# Patient Record
Sex: MALE | Race: WHITE | NOT HISPANIC OR LATINO | Employment: STUDENT | URBAN - METROPOLITAN AREA
[De-identification: names, ages, dates, MRNs, and addresses within clinical notes are randomized per-mention and may not be internally consistent; named-entity substitution may affect disease eponyms.]

---

## 2024-02-19 ENCOUNTER — APPOINTMENT (OUTPATIENT)
Dept: PRIMARY CARE | Facility: CLINIC | Age: 24
End: 2024-02-19
Payer: COMMERCIAL

## 2024-04-16 ENCOUNTER — OFFICE VISIT (OUTPATIENT)
Dept: OTOLARYNGOLOGY | Facility: CLINIC | Age: 24
End: 2024-04-16
Payer: COMMERCIAL

## 2024-04-16 ENCOUNTER — CLINICAL SUPPORT (OUTPATIENT)
Dept: AUDIOLOGY | Facility: CLINIC | Age: 24
End: 2024-04-16
Payer: COMMERCIAL

## 2024-04-16 VITALS — WEIGHT: 182 LBS | HEIGHT: 69 IN | TEMPERATURE: 96.9 F | BODY MASS INDEX: 26.96 KG/M2

## 2024-04-16 DIAGNOSIS — H93.13 TINNITUS OF BOTH EARS: ICD-10-CM

## 2024-04-16 DIAGNOSIS — H93.293 ABNORMAL AUDITORY PERCEPTION OF BOTH EARS: Primary | ICD-10-CM

## 2024-04-16 DIAGNOSIS — T16.1XXA FOREIGN BODY IN RIGHT AUDITORY CANAL, INITIAL ENCOUNTER: ICD-10-CM

## 2024-04-16 PROCEDURE — 99203 OFFICE O/P NEW LOW 30 MIN: CPT | Performed by: OTOLARYNGOLOGY

## 2024-04-16 PROCEDURE — 92550 TYMPANOMETRY & REFLEX THRESH: CPT | Performed by: AUDIOLOGIST

## 2024-04-16 PROCEDURE — 1036F TOBACCO NON-USER: CPT | Performed by: OTOLARYNGOLOGY

## 2024-04-16 PROCEDURE — 92557 COMPREHENSIVE HEARING TEST: CPT | Performed by: AUDIOLOGIST

## 2024-04-16 RX ORDER — ESCITALOPRAM OXALATE 20 MG/1
20 TABLET ORAL
COMMUNITY
Start: 2023-02-14

## 2024-04-16 NOTE — PROGRESS NOTES
HPI  Fortunato Denton is a 23 y.o. male treated for bilateral ear infection and around December with symptoms of otalgia and hearing loss at that time.  Most of that has resolved and he only has some lingering symptoms of clicking in the ears bilaterally, some left some right.  Denies otorrhea.  Did have ear problems as a child.  Audiogram with normal thresholds, type a tympanograms bilaterally.      Past Medical History:   Diagnosis Date    Acute pharyngitis, unspecified 03/04/2015    Sore throat    Acute pharyngitis, unspecified 01/23/2014    Sore throat    Asthma ([facility]     Bitten or stung by nonvenomous insect and other nonvenomous arthropods, initial encounter 09/18/2019    Tick bite    Local infection of the skin and subcutaneous tissue, unspecified 09/18/2019    Infection, skin    Other conditions influencing health status 09/03/2013    Foot pain, unspecified laterality    Pain in right hand 10/05/2016    Pain of right hand    Personal history of other diseases of the nervous system and sense organs 01/23/2014    History of conjunctivitis    Personal history of other diseases of the respiratory system     Personal history of asthma    Personal history of other diseases of the respiratory system 09/29/2015    History of acute sinusitis    Personal history of other diseases of the respiratory system 09/29/2015    History of pharyngitis    Personal history of other diseases of the respiratory system 03/04/2015    History of upper respiratory infection    Personal history of other diseases of the respiratory system 03/21/2017    History of sore throat    Personal history of other diseases of the respiratory system 09/13/2018    History of sore throat    Personal history of other diseases of the respiratory system 03/06/2014    Personal history of acute sinusitis    Personal history of other diseases of the respiratory system 09/13/2013    History of pharyngitis    Personal history of other infectious and  "parasitic diseases 01/23/2014    History of viral infection    Personal history of other infectious and parasitic diseases 01/22/2015    History of viral infection    Personal history of other infectious and parasitic diseases 09/13/2018    History of wound infection    Personal history of other mental and behavioral disorders     History of attention deficit hyperactivity disorder    Personal history of other specified conditions 03/06/2014    History of diarrhea    Unspecified injury of unspecified wrist, hand and finger(s), initial encounter 12/05/2016    Hand injury            Medications:     Current Outpatient Medications:     escitalopram (Lexapro) 20 mg tablet, Take 1 tablet (20 mg) by mouth once daily., Disp: , Rfl:      Allergies:  No Known Allergies     Physical Exam:  Last Recorded Vitals  Temperature 36.1 °C (96.9 °F), height 1.746 m (5' 8.75\"), weight 82.6 kg (182 lb).  General:     General appearance: Well-developed, well-nourished in no acute distress.       Voice:  normal       Head/face: Normal appearance; nontender to palpation     Facial nerve function: Normal and symmetric bilaterally.    Oral/oropharynx:     Oral vestibule: Normal labial and gingival mucosa     Tongue/floor of mouth: Normal without lesion     Oropharynx: Clear.  No lesions present of the hard/soft palate, posterior pharynx    Neck:     Neck: Normal appearance, trachea midline     Salivary glands: Normal to palpation bilaterally     Lymph nodes: No cervical lymphadenopathy to palpation     Thyroid: No thyromegaly.  No palpable nodules     Range of motion: Normal    Neurological:     Cortical functions: Alert and oriented x3, appropriate affect       Larynx/hypopharynx:     Laryngeal findings: Mirror exam inadequate or limited secondary to enlarged base of tongue and/or excessive gagging    Ear:     Ear canal: Normal bilaterally.  Right ear canal with cat hair medially, removed with alligator and microscope     Tympanic membrane: " Intact and mobile bilaterally     Pinna: Normal bilaterally     Hearing:  Gross hearing assessment normal by voice    Nose:     Visualized using: Anterior rhinoscopy     Nasopharynx: Inadequate mirror exam secondary to gag, anatomy.       Nasal dorsum: Nontraumatic midline appearance     Septum: Midline     Inferior turbinates: Normally sized     Mucosa: Bilateral, pink, normal appearing       ASSESSMENT/PLAN:  Right hair removed.  This may help that side. He may try Flonase but given his normal exam and audiogram, I expect that this is just some lingering hold over from eustachian tube dysfunction with the infections and will resolve with time.  He was reassured.  Recheck as needed        Frederic Mancera MD

## 2024-04-16 NOTE — PROGRESS NOTES
"  AUDIOLOGY ADULT AUDIOMETRIC EVALUATION    Name:  Fortunato Denton  :  2000  Age:  23 y.o.  Date of Evaluation:  2024    Reason for visit: Mr. Denton is seen in the clinic today at the request of otolaryngology for an audiologic evaluation.     HISTORY  Patient complains of CRACKLE when swallowing and a \"problematic\" era infection in 2023.  He denies dizzy , fullness and other types of tinnitus outside of the crackle when swallowing.    EVALUATION  See scanned audiogram: “Media” > “Audiology Report”.      RESULTS  Otoscopic Evaluation:  Right Ear: clear ear canal  Left Ear: clear ear canal    Immittance Measures:  Tympanometry:  Right Ear: Type A, normal tympanic membrane mobility with normal middle ear pressure   Left Ear: Type A, normal tympanic membrane mobility with normal middle ear pressure     Acoustic Reflexes:  Ipsilateral Right Ear:  90 95 95  Ipsilateral Left Ear:    90 85 85  Contralateral Right Ear: did not evaluate  Contralateral Left Ear: did not evaluate    Distortion Product Otoacoustic Emissions (DPOAEs):  Right Ear: DNT  Left Ear:   DNT    Audiometry:  Test Technique and Reliability:  BEHAVIORAL   Standard audiometry via GUILLAUME TEST ROOM INSERTS. Reliability is good.    Pure tone air and bone conduction audiometry:  Right Ear:  WITHIN NORMAL LIMITS FROM 250- 8 K HZ.  125 MILD.  Left Ear: WITHIN NORMAL LIMITS FROM 125- 8 K HZ.    Speech Audiometry (Word Recognition Scores):   Right Ear: 100% EXCELLENT  Left Ear:    100 % EXCELLENT    IMPRESSIONS    WITHIN NORMAL LIMITS AU WITH CRACKLE TINNITUS WHEN SWALLOWING.  The presence of acoustic reflexes within normal intensity limits is consistent with normal middle ear and brainstem function, and suggests that auditory sensitivity is not significantly impaired. An elevated or absent acoustic reflex threshold is consistent with a middle ear disorder, hearing loss in the stimulated ear, and/or interruption of neural innervation of " the stapedius muscle. Present DPOAEs suggest normal/near normal cochlear outer hair cell function and are consistent with no greater than a mild hearing loss at those frequencies. Absent DPOAEs are consistent with abnormal cochlear outer hair cell function and some degree of hearing loss at those frequencies.    RECOMMENDATIONS  - Follow up with otolaryngology today as scheduled.  - Audiologic evaluation as needed.  - Annual audiologic evaluation, sooner if an acute change is noted.  - Audiologic evaluation in conjunction with otologic care, if an acute change is noted, and/or annually.  - - Follow-up with audiology annually for routine hearing aid maintenance, sooner if questions/problems arise.  - Follow-up with medical care team as planned.    PATIENT EDUCATION  Discussed results, impressions and recommendations with the patient. Questions were addressed and the patient was encouraged to contact our office should concerns arise.    Time for this encounter: 30 MINUTES    Blanca Escudero  Licensed Audiologist

## 2025-07-14 ENCOUNTER — APPOINTMENT (OUTPATIENT)
Dept: PRIMARY CARE | Facility: CLINIC | Age: 25
End: 2025-07-14
Payer: COMMERCIAL

## 2025-07-14 VITALS
SYSTOLIC BLOOD PRESSURE: 124 MMHG | WEIGHT: 161 LBS | DIASTOLIC BLOOD PRESSURE: 80 MMHG | BODY MASS INDEX: 23.85 KG/M2 | TEMPERATURE: 98.4 F | HEIGHT: 69 IN | OXYGEN SATURATION: 98 % | HEART RATE: 73 BPM

## 2025-07-14 DIAGNOSIS — Z00.00 ANNUAL PHYSICAL EXAM: Primary | ICD-10-CM

## 2025-07-14 DIAGNOSIS — R03.0 ELEVATED BLOOD PRESSURE READING IN OFFICE WITHOUT DIAGNOSIS OF HYPERTENSION: ICD-10-CM

## 2025-07-14 DIAGNOSIS — F41.1 GAD (GENERALIZED ANXIETY DISORDER): ICD-10-CM

## 2025-07-14 PROBLEM — L70.9 ACNE: Status: RESOLVED | Noted: 2025-07-14 | Resolved: 2025-07-14

## 2025-07-14 PROBLEM — F41.8 ANXIETY WITH DEPRESSION: Status: ACTIVE | Noted: 2023-02-15

## 2025-07-14 PROBLEM — J30.2 ALLERGIC RHINITIS, SEASONAL: Status: RESOLVED | Noted: 2025-07-14 | Resolved: 2025-07-14

## 2025-07-14 PROBLEM — F98.8 ADD (ATTENTION DEFICIT DISORDER): Status: RESOLVED | Noted: 2025-07-14 | Resolved: 2025-07-14

## 2025-07-14 PROBLEM — J45.990 BRONCHOSPASM, EXERCISE-INDUCED (HHS-HCC): Status: RESOLVED | Noted: 2025-07-14 | Resolved: 2025-07-14

## 2025-07-14 PROCEDURE — 3008F BODY MASS INDEX DOCD: CPT | Performed by: STUDENT IN AN ORGANIZED HEALTH CARE EDUCATION/TRAINING PROGRAM

## 2025-07-14 PROCEDURE — 99385 PREV VISIT NEW AGE 18-39: CPT | Performed by: STUDENT IN AN ORGANIZED HEALTH CARE EDUCATION/TRAINING PROGRAM

## 2025-07-14 RX ORDER — ESCITALOPRAM OXALATE 20 MG/1
20 TABLET ORAL
Qty: 90 TABLET | Refills: 2 | Status: SHIPPED | OUTPATIENT
Start: 2025-07-14

## 2025-07-14 ASSESSMENT — ENCOUNTER SYMPTOMS
DEPRESSION: 0
OCCASIONAL FEELINGS OF UNSTEADINESS: 0
LIGHT-HEADEDNESS: 0
NERVOUS/ANXIOUS: 1
FEVER: 0
DIZZINESS: 0
WHEEZING: 0
SHORTNESS OF BREATH: 0
PALPITATIONS: 0
TROUBLE SWALLOWING: 0
BLOOD IN STOOL: 0
DIARRHEA: 0
DYSPHORIC MOOD: 0
COUGH: 0
ABDOMINAL PAIN: 0
CHILLS: 0
LOSS OF SENSATION IN FEET: 0
DIFFICULTY URINATING: 0
CONSTIPATION: 1
HEADACHES: 0

## 2025-07-14 ASSESSMENT — PATIENT HEALTH QUESTIONNAIRE - PHQ9
SUM OF ALL RESPONSES TO PHQ9 QUESTIONS 1 AND 2: 0
2. FEELING DOWN, DEPRESSED OR HOPELESS: NOT AT ALL
1. LITTLE INTEREST OR PLEASURE IN DOING THINGS: NOT AT ALL

## 2025-07-14 ASSESSMENT — PAIN SCALES - GENERAL: PAINLEVEL_OUTOF10: 0-NO PAIN

## 2025-07-14 NOTE — PROGRESS NOTES
"Subjective   Patient ID: Fortunato Denton is a 25 y.o. male who presents for Annual Exam. Has been awhile since has has medical care. He is here today with his girlfriend    INTERVAL HX/CURRENT CONCERNS: None     CHRONIC CONDITIONS:  Anxiety - Lexapro 20 mg x 3-4 years. Stable mood. Previously in therapy.  Exercise induced asthma - inhalers as a child     SOCIAL:  Occupation -   Exercise - running   Diet - somewhat healthy  Tobacco - never smoker. Uses Zyn tobacco free nicotine pouches   Alcohol - weekends 5-10 drinks   Drugs - marijuana daily      Health Maintenance  Immunizations: reviewed  Colonoscopy: no family history of colon cancer    Patient Health Questionnaire-2 Score: 0 (7/14/2025  4:11 PM)     Review of Systems   Constitutional:  Negative for chills and fever.   HENT:  Negative for trouble swallowing.    Eyes:  Negative for visual disturbance.   Respiratory:  Negative for cough, shortness of breath and wheezing.    Cardiovascular:  Negative for chest pain and palpitations.   Gastrointestinal:  Positive for constipation (occas). Negative for abdominal pain, blood in stool and diarrhea.   Genitourinary:  Negative for difficulty urinating, penile pain and testicular pain.   Neurological:  Negative for dizziness, light-headedness and headaches.   Psychiatric/Behavioral:  Negative for dysphoric mood. The patient is nervous/anxious.          Objective   Vitals:    07/14/25 1613   BP: 150/90   BP Location: Left arm   Patient Position: Sitting   BP Cuff Size: Adult   Pulse: 73   Temp: 36.9 °C (98.4 °F)   TempSrc: Temporal   SpO2: 98%   Weight: 73 kg (161 lb)   Height: 1.753 m (5' 9\")       Physical Exam  Vitals and nursing note reviewed.   Constitutional:       Appearance: Normal appearance.   HENT:      Head: Normocephalic and atraumatic.      Right Ear: Tympanic membrane, ear canal and external ear normal.      Left Ear: Tympanic membrane, ear canal and external ear normal.      Mouth/Throat:      " Mouth: Mucous membranes are moist.      Pharynx: Oropharynx is clear.   Eyes:      Extraocular Movements: Extraocular movements intact.      Conjunctiva/sclera: Conjunctivae normal.      Pupils: Pupils are equal, round, and reactive to light.   Cardiovascular:      Rate and Rhythm: Normal rate and regular rhythm.      Heart sounds: Normal heart sounds.   Pulmonary:      Effort: Pulmonary effort is normal.      Breath sounds: Normal breath sounds. No wheezing, rhonchi or rales.   Abdominal:      General: Abdomen is flat.      Palpations: Abdomen is soft.      Tenderness: There is no abdominal tenderness.   Musculoskeletal:         General: Normal range of motion.      Cervical back: Normal range of motion and neck supple.   Skin:     General: Skin is warm and dry.   Neurological:      Mental Status: He is alert.   Psychiatric:         Mood and Affect: Mood normal.         Behavior: Behavior normal.         Assessment & Plan  Annual physical exam  Well adult exam.  1. Age appropriate preventative measures reviewed.   2. Encouraged healthy diet and exercise.  3. Immunizations- Reviewed and discussed  4. Labs- discussed screening labs including lipids, HIV, Hep C screenings. Declines. Will consider next year  5. Medications- Reviewed         MICHELLE (generalized anxiety disorder)   Medication: lexapro 20 mg  Continue non-pharmacological interventions including stress reduction, diet, exercise, and sleep.   Patient verbalizes understanding regarding plan of care and all questions answered.  Follow up for re-evaluation and dose adjustment as needed.    Orders:    escitalopram (Lexapro) 20 mg tablet; Take 1 tablet (20 mg) by mouth once daily.    Elevated blood pressure reading in office without diagnosis of hypertension  Elevated blood pressure on presentation. Improved on repeat at end of encounter. Likely [facility], admits to anxiety at office visits. Will let me know if elevated readings if/when checks out of office.

## 2025-07-14 NOTE — PATIENT INSTRUCTIONS
Nice to meet you today!    Refill for Lexapro sent to pharmacy. Think about cutting back/stopping marijuana - this can make anxiety worse in some people.   Blood pressure elevated but improved when we rechecked it. If you are noticing elevated blood pressures at home let me know.     Recommend healthy diet  that includes lean proteins, vegetables, fruits, and whole grains.  Limit saturated fats, excess sodium, and processed foods.  Limit sugary drinks and sweets.  Moderate exercise at least 30 minutes per day, 5 days per week.

## 2025-08-21 ENCOUNTER — TELEPHONE (OUTPATIENT)
Dept: PRIMARY CARE | Facility: CLINIC | Age: 25
End: 2025-08-21
Payer: COMMERCIAL